# Patient Record
Sex: MALE | Race: WHITE | Employment: UNEMPLOYED | URBAN - METROPOLITAN AREA
[De-identification: names, ages, dates, MRNs, and addresses within clinical notes are randomized per-mention and may not be internally consistent; named-entity substitution may affect disease eponyms.]

---

## 2017-05-08 ENCOUNTER — OFFICE VISIT (OUTPATIENT)
Dept: ORTHOPEDIC SURGERY | Age: 42
End: 2017-05-08

## 2017-05-08 VITALS
BODY MASS INDEX: 35.89 KG/M2 | WEIGHT: 265 LBS | SYSTOLIC BLOOD PRESSURE: 124 MMHG | DIASTOLIC BLOOD PRESSURE: 70 MMHG | HEIGHT: 72 IN

## 2017-05-08 DIAGNOSIS — M25.462 EFFUSION OF LEFT KNEE: Primary | ICD-10-CM

## 2017-05-08 PROCEDURE — 99213 OFFICE O/P EST LOW 20 MIN: CPT | Performed by: ORTHOPAEDIC SURGERY

## 2017-05-08 RX ORDER — ATORVASTATIN CALCIUM 20 MG/1
20 TABLET, FILM COATED ORAL DAILY
COMMUNITY

## 2017-05-08 RX ORDER — LISINOPRIL 10 MG/1
10 TABLET ORAL DAILY
COMMUNITY
End: 2017-07-27 | Stop reason: ALTCHOICE

## 2017-05-17 ENCOUNTER — OFFICE VISIT (OUTPATIENT)
Dept: ORTHOPEDIC SURGERY | Age: 42
End: 2017-05-17

## 2017-05-17 DIAGNOSIS — M25.462 EFFUSION OF LEFT KNEE: Primary | ICD-10-CM

## 2017-05-17 PROCEDURE — 99213 OFFICE O/P EST LOW 20 MIN: CPT | Performed by: ORTHOPAEDIC SURGERY

## 2017-06-16 ENCOUNTER — TELEPHONE (OUTPATIENT)
Dept: ORTHOPEDIC SURGERY | Age: 42
End: 2017-06-16

## 2017-07-27 ENCOUNTER — HOSPITAL ENCOUNTER (OUTPATIENT)
Dept: SURGERY | Age: 42
Discharge: HOME | End: 2017-07-27
Attending: ORTHOPAEDIC SURGERY | Admitting: ORTHOPAEDIC SURGERY

## 2017-07-27 VITALS
WEIGHT: 275.1 LBS | TEMPERATURE: 97 F | RESPIRATION RATE: 16 BRPM | BODY MASS INDEX: 38.51 KG/M2 | HEIGHT: 71 IN | DIASTOLIC BLOOD PRESSURE: 91 MMHG | HEART RATE: 70 BPM | SYSTOLIC BLOOD PRESSURE: 147 MMHG | OXYGEN SATURATION: 97 %

## 2017-07-27 LAB
ANION GAP SERPL CALCULATED.3IONS-SCNC: 11 MMOL/L (ref 3–16)
BUN BLDV-MCNC: 16 MG/DL (ref 7–20)
CALCIUM SERPL-MCNC: 9.5 MG/DL (ref 8.3–10.6)
CHLORIDE BLD-SCNC: 102 MMOL/L (ref 99–110)
CO2: 26 MMOL/L (ref 21–32)
CREAT SERPL-MCNC: 0.8 MG/DL (ref 0.9–1.3)
GFR AFRICAN AMERICAN: >60
GFR NON-AFRICAN AMERICAN: >60
GLUCOSE BLD-MCNC: 122 MG/DL (ref 70–99)
HCT VFR BLD CALC: 41 % (ref 40.5–52.5)
HEMOGLOBIN: 14 G/DL (ref 13.5–17.5)
MCH RBC QN AUTO: 28.3 PG (ref 26–34)
MCHC RBC AUTO-ENTMCNC: 34.1 G/DL (ref 31–36)
MCV RBC AUTO: 82.9 FL (ref 80–100)
PDW BLD-RTO: 14 % (ref 12.4–15.4)
PLATELET # BLD: 173 K/UL (ref 135–450)
PMV BLD AUTO: 7.8 FL (ref 5–10.5)
POTASSIUM SERPL-SCNC: 4.4 MMOL/L (ref 3.5–5.1)
RBC # BLD: 4.94 M/UL (ref 4.2–5.9)
SODIUM BLD-SCNC: 139 MMOL/L (ref 136–145)
WBC # BLD: 5.4 K/UL (ref 4–11)

## 2017-07-27 RX ORDER — LIDOCAINE HYDROCHLORIDE 10 MG/ML
0.5 INJECTION, SOLUTION EPIDURAL; INFILTRATION; INTRACAUDAL; PERINEURAL ONCE
Status: COMPLETED | OUTPATIENT
Start: 2017-07-27 | End: 2017-07-27

## 2017-07-27 RX ORDER — HYDROCODONE BITARTRATE AND ACETAMINOPHEN 5; 325 MG/1; MG/1
1 TABLET ORAL
Status: COMPLETED | OUTPATIENT
Start: 2017-07-27 | End: 2017-07-27

## 2017-07-27 RX ORDER — SODIUM CHLORIDE, SODIUM LACTATE, POTASSIUM CHLORIDE, CALCIUM CHLORIDE 600; 310; 30; 20 MG/100ML; MG/100ML; MG/100ML; MG/100ML
INJECTION, SOLUTION INTRAVENOUS CONTINUOUS
Status: DISCONTINUED | OUTPATIENT
Start: 2017-07-27 | End: 2017-07-28 | Stop reason: HOSPADM

## 2017-07-27 RX ORDER — IBUPROFEN 800 MG/1
800 TABLET ORAL EVERY 6 HOURS PRN
Qty: 120 TABLET | Refills: 3 | Status: SHIPPED | OUTPATIENT
Start: 2017-07-27

## 2017-07-27 RX ORDER — HYDROCODONE BITARTRATE AND ACETAMINOPHEN 5; 325 MG/1; MG/1
1 TABLET ORAL EVERY 6 HOURS PRN
Qty: 30 TABLET | Refills: 0 | Status: SHIPPED | OUTPATIENT
Start: 2017-07-27 | End: 2017-08-03

## 2017-07-27 RX ORDER — HYDROMORPHONE HCL 110MG/55ML
0.5 PATIENT CONTROLLED ANALGESIA SYRINGE INTRAVENOUS EVERY 5 MIN PRN
Status: DISCONTINUED | OUTPATIENT
Start: 2017-07-27 | End: 2017-07-28 | Stop reason: HOSPADM

## 2017-07-27 RX ORDER — HYDROMORPHONE HCL 110MG/55ML
0.25 PATIENT CONTROLLED ANALGESIA SYRINGE INTRAVENOUS EVERY 5 MIN PRN
Status: DISCONTINUED | OUTPATIENT
Start: 2017-07-27 | End: 2017-07-28 | Stop reason: HOSPADM

## 2017-07-27 RX ORDER — FENTANYL CITRATE 50 UG/ML
50 INJECTION, SOLUTION INTRAMUSCULAR; INTRAVENOUS EVERY 5 MIN PRN
Status: DISCONTINUED | OUTPATIENT
Start: 2017-07-27 | End: 2017-07-28 | Stop reason: HOSPADM

## 2017-07-27 RX ORDER — FENTANYL CITRATE 50 UG/ML
25 INJECTION, SOLUTION INTRAMUSCULAR; INTRAVENOUS EVERY 5 MIN PRN
Status: DISCONTINUED | OUTPATIENT
Start: 2017-07-27 | End: 2017-07-28 | Stop reason: HOSPADM

## 2017-07-27 RX ORDER — SODIUM CHLORIDE 9 MG/ML
INJECTION, SOLUTION INTRAVENOUS CONTINUOUS
Status: CANCELLED | OUTPATIENT
Start: 2017-07-27

## 2017-07-27 RX ORDER — SODIUM CHLORIDE 0.9 % (FLUSH) 0.9 %
10 SYRINGE (ML) INJECTION EVERY 12 HOURS SCHEDULED
Status: CANCELLED | OUTPATIENT
Start: 2017-07-27

## 2017-07-27 RX ORDER — LABETALOL HYDROCHLORIDE 5 MG/ML
5 INJECTION, SOLUTION INTRAVENOUS EVERY 10 MIN PRN
Status: DISCONTINUED | OUTPATIENT
Start: 2017-07-27 | End: 2017-07-28 | Stop reason: HOSPADM

## 2017-07-27 RX ORDER — SODIUM CHLORIDE 0.9 % (FLUSH) 0.9 %
10 SYRINGE (ML) INJECTION PRN
Status: CANCELLED | OUTPATIENT
Start: 2017-07-27

## 2017-07-27 RX ORDER — ONDANSETRON 2 MG/ML
4 INJECTION INTRAMUSCULAR; INTRAVENOUS
Status: ACTIVE | OUTPATIENT
Start: 2017-07-27 | End: 2017-07-27

## 2017-07-27 RX ORDER — LOSARTAN POTASSIUM 25 MG/1
25 TABLET ORAL DAILY
COMMUNITY

## 2017-07-27 RX ORDER — SODIUM CHLORIDE, SODIUM LACTATE, POTASSIUM CHLORIDE, CALCIUM CHLORIDE 600; 310; 30; 20 MG/100ML; MG/100ML; MG/100ML; MG/100ML
INJECTION, SOLUTION INTRAVENOUS CONTINUOUS
Status: CANCELLED | OUTPATIENT
Start: 2017-07-27

## 2017-07-27 RX ADMIN — FENTANYL CITRATE 50 MCG: 50 INJECTION, SOLUTION INTRAMUSCULAR; INTRAVENOUS at 08:11

## 2017-07-27 RX ADMIN — SODIUM CHLORIDE, SODIUM LACTATE, POTASSIUM CHLORIDE, CALCIUM CHLORIDE: 600; 310; 30; 20 INJECTION, SOLUTION INTRAVENOUS at 06:32

## 2017-07-27 RX ADMIN — HYDROCODONE BITARTRATE AND ACETAMINOPHEN 1 TABLET: 5; 325 TABLET ORAL at 08:29

## 2017-07-27 RX ADMIN — LIDOCAINE HYDROCHLORIDE 0.5 ML: 10 INJECTION, SOLUTION EPIDURAL; INFILTRATION; INTRACAUDAL; PERINEURAL at 06:28

## 2017-07-27 ASSESSMENT — PAIN SCALES - GENERAL
PAINLEVEL_OUTOF10: 7
PAINLEVEL_OUTOF10: 3
PAINLEVEL_OUTOF10: 2

## 2017-07-27 ASSESSMENT — PAIN - FUNCTIONAL ASSESSMENT: PAIN_FUNCTIONAL_ASSESSMENT: 0-10

## 2017-07-27 ASSESSMENT — PAIN DESCRIPTION - PAIN TYPE
TYPE: SURGICAL PAIN

## 2017-07-27 ASSESSMENT — PAIN DESCRIPTION - DESCRIPTORS: DESCRIPTORS: ACHING

## 2017-07-31 DIAGNOSIS — S83.204A OTHER TEAR OF UNSPECIFIED MENISCUS, CURRENT INJURY, LEFT KNEE, INITIAL ENCOUNTER: Primary | ICD-10-CM

## 2017-07-31 DIAGNOSIS — M67.52 PLICA OF KNEE, LEFT: ICD-10-CM

## 2017-08-02 ENCOUNTER — HOSPITAL ENCOUNTER (OUTPATIENT)
Dept: PHYSICAL THERAPY | Age: 42
Discharge: HOME | End: 2017-08-02
Admitting: ORTHOPAEDIC SURGERY

## 2017-08-02 ENCOUNTER — OFFICE VISIT (OUTPATIENT)
Dept: ORTHOPEDIC SURGERY | Age: 42
End: 2017-08-02

## 2017-08-02 VITALS — BODY MASS INDEX: 38.52 KG/M2 | HEIGHT: 71 IN | WEIGHT: 275.13 LBS

## 2017-08-02 DIAGNOSIS — S83.232D COMPLEX TEAR OF MEDIAL MENISCUS OF LEFT KNEE AS CURRENT INJURY, SUBSEQUENT ENCOUNTER: Primary | ICD-10-CM

## 2017-08-02 PROBLEM — S83.232A COMPLEX TEAR OF MEDIAL MENISCUS OF LEFT KNEE AS CURRENT INJURY: Status: ACTIVE | Noted: 2017-08-02

## 2017-08-02 PROCEDURE — 99024 POSTOP FOLLOW-UP VISIT: CPT | Performed by: ORTHOPAEDIC SURGERY

## 2017-08-08 ENCOUNTER — HOSPITAL ENCOUNTER (OUTPATIENT)
Dept: PHYSICAL THERAPY | Age: 42
Discharge: HOME | End: 2017-08-08
Admitting: ORTHOPAEDIC SURGERY

## 2017-08-11 ENCOUNTER — HOSPITAL ENCOUNTER (OUTPATIENT)
Dept: PHYSICAL THERAPY | Age: 42
Discharge: HOME | End: 2017-08-11
Admitting: ORTHOPAEDIC SURGERY

## 2017-08-14 ENCOUNTER — HOSPITAL ENCOUNTER (OUTPATIENT)
Dept: PHYSICAL THERAPY | Age: 42
Discharge: HOME | End: 2017-08-14
Admitting: ORTHOPAEDIC SURGERY

## 2017-08-18 ENCOUNTER — HOSPITAL ENCOUNTER (OUTPATIENT)
Dept: PHYSICAL THERAPY | Age: 42
Discharge: HOME | End: 2017-08-18
Admitting: ORTHOPAEDIC SURGERY

## 2017-08-21 ENCOUNTER — HOSPITAL ENCOUNTER (OUTPATIENT)
Dept: PHYSICAL THERAPY | Age: 42
Discharge: HOME | End: 2017-08-21
Admitting: ORTHOPAEDIC SURGERY

## 2017-08-24 ENCOUNTER — HOSPITAL ENCOUNTER (OUTPATIENT)
Dept: PHYSICAL THERAPY | Age: 42
Discharge: HOME | End: 2017-08-24
Admitting: ORTHOPAEDIC SURGERY

## 2017-08-30 ENCOUNTER — OFFICE VISIT (OUTPATIENT)
Dept: ORTHOPEDIC SURGERY | Age: 42
End: 2017-08-30

## 2017-08-30 VITALS — HEIGHT: 70 IN | WEIGHT: 275 LBS | BODY MASS INDEX: 39.37 KG/M2

## 2017-08-30 DIAGNOSIS — S83.232D COMPLEX TEAR OF MEDIAL MENISCUS OF LEFT KNEE AS CURRENT INJURY, SUBSEQUENT ENCOUNTER: Primary | ICD-10-CM

## 2017-08-30 PROCEDURE — 99024 POSTOP FOLLOW-UP VISIT: CPT | Performed by: ORTHOPAEDIC SURGERY

## 2017-09-05 ENCOUNTER — HOSPITAL ENCOUNTER (OUTPATIENT)
Dept: PHYSICAL THERAPY | Age: 42
Discharge: HOME | End: 2017-09-05
Admitting: ORTHOPAEDIC SURGERY

## 2017-09-08 ENCOUNTER — HOSPITAL ENCOUNTER (OUTPATIENT)
Dept: PHYSICAL THERAPY | Age: 42
Discharge: HOME | End: 2017-09-08
Admitting: ORTHOPAEDIC SURGERY

## 2017-09-11 ENCOUNTER — HOSPITAL ENCOUNTER (OUTPATIENT)
Dept: PHYSICAL THERAPY | Age: 42
Discharge: HOME | End: 2017-09-11
Admitting: ORTHOPAEDIC SURGERY

## 2017-09-18 ENCOUNTER — HOSPITAL ENCOUNTER (OUTPATIENT)
Dept: PHYSICAL THERAPY | Age: 42
Discharge: HOME | End: 2017-09-18
Admitting: ORTHOPAEDIC SURGERY

## 2017-09-27 ENCOUNTER — HOSPITAL ENCOUNTER (OUTPATIENT)
Dept: PHYSICAL THERAPY | Age: 42
Discharge: HOME | End: 2017-09-27
Admitting: ORTHOPAEDIC SURGERY

## 2017-10-03 ENCOUNTER — HOSPITAL ENCOUNTER (OUTPATIENT)
Dept: PHYSICAL THERAPY | Age: 42
Discharge: HOME | End: 2017-10-03
Admitting: ORTHOPAEDIC SURGERY

## 2017-10-03 NOTE — FLOWSHEET NOTE
stairs     Home Exercise Program:  Discussed gym progressions and resuming platform tennis, gym 3x week,light regime of walk/ jog program. Recommended icing knee and maintaining stretching flexibility program.   [x] (77582) Reviewed/Progressed HEP activities related to strengthening, flexibility, endurance, ROM of core, proximal hip and LE for functional self-care, mobility, lifting and ambulation/stair navigation   [] (43008)Reviewed/Progressed HEP activities related to improving balance, coordination, kinesthetic sense, posture, motor skill, proprioception of core, proximal hip and LE for self care, mobility, lifting, and ambulation/stair navigation      Manual Treatments:  PROM / STM / Oscillations-Mobs:  G-I, II, III, IV (PA's, Inf., Post.)  [x] (45217) Provided manual therapy to mobilize LE, proximal hip and/or LS spine soft tissue/joints for the purpose of modulating pain, promoting relaxation,  increasing ROM, reducing/eliminating soft tissue swelling/inflammation/restriction, improving soft tissue extensibility and allowing for proper ROM for normal function with self care, mobility, lifting and ambulation. Dry needling manual therapy: consisted on the placement of 7 needles in the following muscles:  distal VLAT, rectus femoris mid belly. A 50 mm needle was inserted, piston, rotated, and coned to produce intramuscular mobilization. These techniques were used to restore functional range of motion, reduce muscle spasm and induce healing in the corresponding musculature. (18328)  Clean Technique was utilized today while applying Dry needling treatment. The treatment sites where cleaned with 70% solution of  isopropyl alcohol . The PT washed their hands and utilized treatment gloves along with hand  prior to inserting the needles. All needles where removed and discarded in the appropriate sharps container.        Modalities: heat x10'     Charges:  Timed Code Treatment Minutes: 40 mins   Total Alter current plan (see comments)  [] Plan of care initiated [] Hold pending MD visit [] Discharge     Electronically signed by:  Steve Osborn PT

## 2017-10-05 ENCOUNTER — HOSPITAL ENCOUNTER (OUTPATIENT)
Dept: PHYSICAL THERAPY | Age: 42
Discharge: HOME | End: 2017-10-05
Admitting: ORTHOPAEDIC SURGERY

## 2017-10-05 NOTE — FLOWSHEET NOTE
[address], [facility]    Physical Therapy Daily Treatment Note  Date:  10/5/2017    Patient Name:  Scott Dowell    :  1975  MRN: [id number]  Restrictions/Precautions:    Medical/Treatment Diagnosis Information:  Diagnosis: U85.787J (meniscus tear left knee), R27.92 (plica of left knee)   Treatment Diagnosis: M25.562 (L knee pain)  Insurance/Certification information:  PT Insurance Information:   Physician Information:  Referring Practitioner: Dr. Grant Almeida of care signed (Y/N):     Date of Patient follow up with Physician: Kolton DIAZ     G-Code (if applicable):      Date G-Code Applied:         Progress Note: [x]  Yes  []  No  Next due by: Visit #10       Latex Allergy:  [x]NO      []YES  Preferred Language for Healthcare:   [x]English       []other:    Visit # Insurance Allowable   12  10/5 20     Pain level:  1/10     SUBJECTIVE: 9  Weeks s/p and admits to still having some medial knee pain noticed with walking but not consistently. Had some soreness from DN but not significant like he had expected. Has held himself from [employer] currently in light of having medial knee pain along border patella. OBJECTIVE:   Observation:   Test measurements:      L knee ROM -4 degrees   14 L knee AROM 0-96 degrees,  110 PROM   17  Normal gait entering clinic. Steri strips removed, portals closed. No drainage. ROM 0-115/ 120.  17  Tight extension pre tx. 0 ext with gentle PROM OP.  RT.  ROM 0-132  17 scar tissue, infrapatellar tenderness/ +ITB tightness/ tenderness  17 + mild increased swelling over the fat pad and portals. + tight ITB distal- central 1/3. Medial border of the patella continues with tenderness. 10/5/17 -TTP over the medial knee.  Improved flexibility to ITB, less TTP    RESTRICTIONS/PRECAUTIONS: PMM, excision of medial synovial plica 3/00/82    Exercises/Interventions:     Therapeutic Ex  40' Resistance Sets/sec Reps Notes   Retro Stepper/BIKE   6'    longsit hamstring/ incline board/ pirformis       SLR  Flex/  ABD/ EXT   SLR+  Supine  2# 30\" 3xSAQ wallsits  BS 1min 3x    Clam ABD 2#1 30    Hip Ext /table        bridge            Leg Press Ecc 0-  SL   120#    1 25    Cybex HS curl 35# 1 25    MH TKE  ABD/Flex   75#  3 10    Glute side walks blue 2 10    SL balance   HOLDGastroc/soleus stretch  standing sheetpull  3 30 secs ea    LSD  HOLD   CC TRX squats 2 chair w/ airmat HOLD   Rev slider lunge     Manual Intervention        Knee mobs/PROMTib/Fem Mobs       Patella Mobs  Ankle mobs  STM  DN  NMR re-education        [place]/Biofeedback 10/10       G. Med activaiton/sidelying       G. Max Activation/prone       Hip Ext full ROM G. Activation       Bosu Bal and Prop- G Med       Single leg stance/Balance/Prop Tandem stance  30\" 3x  1/2 foam   Bosu Retro G. Med act       bosu squats   x25 Slight medial knee pain   Load and explode   NPV    Broad jumps DL/ SL      Ladders    x5'  FWD/ SIDE/ REV PLANES   changing directions         Therapeutic Exercise and NMR EXR  [x] (50950) Provided verbal/tactile cueing for activities related to strengthening, flexibility, endurance, ROM for improvements in LE, proximal hip, and core control with self care, mobility, lifting, ambulation.  [] (26884) Provided verbal/tactile cueing for activities related to improving balance, coordination, kinesthetic sense, posture, motor skill, proprioception  to assist with LE, proximal hip, and core control in self care, mobility, lifting, ambulation and eccentric single leg control.      NMR and Therapeutic Activities:    [] (29713 or 08361) Provided verbal/tactile cueing for activities related to improving balance, coordination, kinesthetic sense, posture, motor skill, proprioception and motor activation to allow for proper function of core, proximal hip and LE with self care and ADLs  [] (49257) Gait Re-education- Provided training and instruction to the patient for proper LE, core and proximal hip recruitment and positioning and eccentric body weight control with ambulation re-education including up and down stairs     Home Exercise Program:     [x] (02237) Reviewed/Progressed HEP activities related to strengthening, flexibility, endurance, ROM of core, proximal hip and LE for functional self-care, mobility, lifting and ambulation/stair navigation   [] (41719)Reviewed/Progressed HEP activities related to improving balance, coordination, kinesthetic sense, posture, motor skill, proprioception of core, proximal hip and LE for self care, mobility, lifting, and ambulation/stair navigation      Manual Treatments:  r.       Modalities: '     Charges:  Timed Code Treatment Minutes: 40 mins   Total Treatment Minutes: 40 mins     [] EVAL  [x] DS(35515) x  3   [] IONTO  [] NMR (28859) x      [] VASO  [] Manual (06650) x       [] Other:   [] TA x       [] Mech Traction (03577)  [] ES(attended) (00701)      [] ES (un) (40013):     GOALS:   Patient stated goal: \"full function of left knee and be able to run, golf, and play platform tennis\"    Therapist goals for Patient:   Short Term Goals: To be achieved in: 2 weeks  1. Independent in HEP and progression per patient tolerance, in order to prevent re-injury. 2. Patient will have a decrease in pain to facilitate improvement in movement, function, and ADLs as indicated by Functional Deficits. Long Term Goals: To be achieved in: 8 weeks  1. Disability index score of 20% or less for the LEFS to assist with reaching prior level of function. 2. Patient will demonstrate increased AROM to within functional limites to allow for proper joint functioning as indicated by patients Functional Deficits. 3. Patient will demonstrate an increase in hip/knee strength 5/5 to allow for proper functional mobility as indicated by patients Functional Deficits.    4. Patient will return to all functional/recreational activities without increased symptoms or restriction. 5. Patient will return to pain free platform tennis. Progression Towards Functional goals:  [x] Patient is progressing as expected towards functional goals listed. [] Progression is slowed due to complexities listed. [] Progression has been slowed due to co-morbidities. [] Plan just implemented, too soon to assess goals progression  [] Other:     ASSESSMENT:        Treatment/Activity Tolerance:  [x] Patient tolerated treatment well [x] Patient limited by fatique  [] Patient limited by pain  [] Patient limited by other medical complications  [x] Other: Pt reported having some MJL discomfort with mini squats/ sls and any activity that loaded the medial compartment. Quads fatigue with progressions today but goal was not to aggravate the joint. Prognosis: [x] Good [] Fair  [] Poor    Patient Requires Follow-up: [x] Yes  [] No    PLAN: Progress strength but continue to monitor MJL soreness/ discomfort. Plans to increase flexibility with quad stretches while maintaining ITB flexibility.   [x] Continue per plan of care [] Alter current plan (see comments)  [] Plan of care initiated [] Hold pending MD visit [] Discharge     Electronically signed by: Shanthi Quick , KAYCE, [id number]

## 2017-10-11 ENCOUNTER — HOSPITAL ENCOUNTER (OUTPATIENT)
Dept: PHYSICAL THERAPY | Age: 42
Discharge: HOME | End: 2017-10-11
Admitting: ORTHOPAEDIC SURGERY

## 2017-10-11 NOTE — FLOWSHEET NOTE
the appropriate sharps container. Modalities: heat x10'     Charges:  Timed Code Treatment Minutes: 40 mins   Total Treatment Minutes: 40 mins     [] EVAL  [x] PU(45025) x  1   [] IONTO  [] NMR (90901) x      [] VASO  [x] Manual (17620) x  2    [] Other:   [] TA x       [] Mech Traction (78768)  [x] ES(attended) (33659)      [] ES (un) (52096):     GOALS:   Patient stated goal: \"full function of left knee and be able to run, golf, and play platform tennis\"    Therapist goals for Patient:   Short Term Goals: To be achieved in: 2 weeks  1. Independent in HEP and progression per patient tolerance, in order to prevent re-injury. 2. Patient will have a decrease in pain to facilitate improvement in movement, function, and ADLs as indicated by Functional Deficits. Long Term Goals: To be achieved in: 8 weeks  1. Disability index score of 20% or less for the LEFS to assist with reaching prior level of function. 2. Patient will demonstrate increased AROM to within functional limites to allow for proper joint functioning as indicated by patients Functional Deficits. 3. Patient will demonstrate an increase in hip/knee strength 5/5 to allow for proper functional mobility as indicated by patients Functional Deficits. 4. Patient will return to all functional/recreational activities without increased symptoms or restriction. 5. Patient will return to pain free platform tennis. Progression Towards Functional goals:  [x] Patient is progressing as expected towards functional goals listed. [] Progression is slowed due to complexities listed. [] Progression has been slowed due to co-morbidities.   [] Plan just implemented, too soon to assess goals progression  [] Other:     ASSESSMENT:        Treatment/Activity Tolerance:  [x] Patient tolerated treatment well [x] Patient limited by fatique  [] Patient limited by pain  [] Patient limited by other medical complications  [x] Other: Pt reported having some MJL

## 2017-10-18 ENCOUNTER — HOSPITAL ENCOUNTER (OUTPATIENT)
Dept: PHYSICAL THERAPY | Age: 42
Discharge: HOME | End: 2017-10-18
Admitting: ORTHOPAEDIC SURGERY

## 2017-10-18 NOTE — FLOWSHEET NOTE
[address], [facility]    Physical Therapy Daily Treatment Note  Date:  10/18/2017    Patient Name:  Yusuf Luciano    :  1975  MRN: [id number]  Restrictions/Precautions:    Medical/Treatment Diagnosis Information:  Diagnosis: S50.337B (meniscus tear left knee), U65.69 (plica of left knee)   Treatment Diagnosis: M25.562 (L knee pain)  Insurance/Certification information:  PT Insurance Information:   Physician Information:  Referring Practitioner: Dr. Melida Moore of care signed (Y/N):     Date of Patient follow up with Physician: Ana Lilia Angulo PRN     G-Code (if applicable):      Date G-Code Applied:         Progress Note: [x]  Yes  []  No  Next due by: Visit #10       Latex Allergy:  [x]NO      []YES  Preferred Language for Healthcare:   [x]English       []other:    Visit # Insurance Allowable   14  10/18 20     Pain level:  1/10     SUBJECTIVE: 9  Weeks s/p Pt reports he noticed a big difference following last Dn, pt reports playing platform tennis 2 times this past week, with no issues during just increased tightness following. OBJECTIVE:   Observation:   Test measurements:      L knee ROM -4 degrees    L knee AROM 0-96 degrees,  110 PROM   17  Normal gait entering clinic. Steri strips removed, portals closed. No drainage. ROM 0-115/ 120.  17  Tight extension pre tx. 0 ext with gentle PROM OP.  RT.  ROM 0-132  17 scar tissue, infrapatellar tenderness/ +ITB tightness/ tenderness  17 + mild increased swelling over the fat pad and portals. + tight ITB distal- central 1/3. Medial border of the patella continues with tenderness. 10/5/17 -TTP over the medial knee.  Improved flexibility to ITB, less TTP    RESTRICTIONS/PRECAUTIONS: PMM, excision of medial synovial plica     Exercises/Interventions:     Therapeutic Ex  40' Resistance Sets/sec Reps Notes   Retro Stepper/BIKE   6'    longsit hamstring/ incline board/ pirformis       SLR  Flex/  ABD/ EXT   SLR+  Supine  2# 30\" 3xSAQ wallsits  BS 1min 3x    Clam ABD 2#1 30    Hip Ext /table        bridge            Leg Press Ecc 0-  SL   120#    1 25    Cybex HS curl 35# 1 25    MH TKE  ABD/Flex   75#  3 10    Glute side walks blue 2 10    SL balance   HOLDGastroc/soleus stretch  standing sheetpull  3 30 secs ea    LSD  HOLD   CC TRX squats 2 chair w/ airmat HOLD   Rev slider lunge     Manual Intervention        Knee mobs/PROMTib/Fem Mobs         5All directions Ankle mobs   STM/IASTM  25VMO, vastus lateralis,  quad tendon, medial retinaculum   ITB foam roller 5' DN  5 min  L distal vlat, mid belly rectus femoris    NMR re-education        [place]izen [place]/Biofeedback 10/10       G. Med activaiton/sidelying       G. Max Activation/prone       Hip Ext full ROM G. Activation       Bosu Bal and Prop- G Med       Single leg stance/Balance/Prop Tandem stance  30\" 3x  1/2 foam   Bosu Retro G. Med act       bosu squats   x25 Slight medial knee pain   Load and explode   NPV    Broad jumps DL/ SL      Ladders    x5'  FWD/ SIDE/ REV PLANES   changing directions         Therapeutic Exercise and NMR EXR  [x] (20299) Provided verbal/tactile cueing for activities related to strengthening, flexibility, endurance, ROM for improvements in LE, proximal hip, and core control with self care, mobility, lifting, ambulation.  [] (31214) Provided verbal/tactile cueing for activities related to improving balance, coordination, kinesthetic sense, posture, motor skill, proprioception  to assist with LE, proximal hip, and core control in self care, mobility, lifting, ambulation and eccentric single leg control.      NMR and Therapeutic Activities:    [] (66626 or 25584) Provided verbal/tactile cueing for activities related to improving balance, coordination, kinesthetic sense, posture, motor skill, proprioception and motor activation to allow for proper function of core, proximal hip and LE with self care and ADLs  [] (91820) Gait Re-education- Provided training and instruction to the patient for proper LE, core and proximal hip recruitment and positioning and eccentric body weight control with ambulation re-education including up and down stairs     Home Exercise Program:     [x] (55963) Reviewed/Progressed HEP activities related to strengthening, flexibility, endurance, ROM of core, proximal hip and LE for functional self-care, mobility, lifting and ambulation/stair navigation   [] (21765)Reviewed/Progressed HEP activities related to improving balance, coordination, kinesthetic sense, posture, motor skill, proprioception of core, proximal hip and LE for self care, mobility, lifting, and ambulation/stair navigation      Manual Treatments:  PROM / STM / Oscillations-Mobs:  G-I, II, III, IV (PA's, Inf., Post.)  [x] (67402) Provided manual therapy to mobilize LE, proximal hip and/or LS spine soft tissue/joints for the purpose of modulating pain, promoting relaxation,  increasing ROM, reducing/eliminating soft tissue swelling/inflammation/restriction, improving soft tissue extensibility and allowing for proper ROM for normal function with self care, mobility, lifting and ambulation. Dry needling manual therapy: consisted on the placement of 7 needles in the following muscles:  distal VLAT, rectus femoris mid belly. A 50 mm needle was inserted, piston, rotated, and coned to produce intramuscular mobilization. These techniques were used to restore functional range of motion, reduce muscle spasm and induce healing in the corresponding musculature. (43121)  Clean Technique was utilized today while applying Dry needling treatment. The treatment sites where cleaned with 70% solution of  isopropyl alcohol . The PT washed their hands and utilized treatment gloves along with hand  prior to inserting the needles. All needles where removed and discarded in the appropriate sharps container.        Modalities: heat x10' Charges:  Timed Code Treatment Minutes: 40 mins   Total Treatment Minutes: 40 mins     [] EVAL  [x] XP(69758) x  1   [] IONTO  [] NMR (62973) x      [] VASO  [x] Manual (57103) x  2    [] Other:   [] TA x       [] Mech Traction (97696)  [x] ES(attended) (32835)      [] ES (un) (72939):     GOALS:   Patient stated goal: \"full function of left knee and be able to run, golf, and play platform tennis\"    Therapist goals for Patient:   Short Term Goals: To be achieved in: 2 weeks  1. Independent in HEP and progression per patient tolerance, in order to prevent re-injury. 2. Patient will have a decrease in pain to facilitate improvement in movement, function, and ADLs as indicated by Functional Deficits. Long Term Goals: To be achieved in: 8 weeks  1. Disability index score of 20% or less for the LEFS to assist with reaching prior level of function. 2. Patient will demonstrate increased AROM to within functional limites to allow for proper joint functioning as indicated by patients Functional Deficits. 3. Patient will demonstrate an increase in hip/knee strength 5/5 to allow for proper functional mobility as indicated by patients Functional Deficits. 4. Patient will return to all functional/recreational activities without increased symptoms or restriction. 5. Patient will return to pain free platform tennis. Progression Towards Functional goals:  [x] Patient is progressing as expected towards functional goals listed. [] Progression is slowed due to complexities listed. [] Progression has been slowed due to co-morbidities.   [] Plan just implemented, too soon to assess goals progression  [] Other:     ASSESSMENT:        Treatment/Activity Tolerance:  [x] Patient tolerated treatment well [x] Patient limited by fatique  [] Patient limited by pain  [] Patient limited by other medical complications  [x] Other: Pt reported having some MJL discomfort with mini squats/ sls and any activity that loaded the

## 2017-10-20 ENCOUNTER — HOSPITAL ENCOUNTER (OUTPATIENT)
Dept: PHYSICAL THERAPY | Age: 42
Discharge: HOME | End: 2017-10-20
Admitting: ORTHOPAEDIC SURGERY

## 2017-10-20 NOTE — FLOWSHEET NOTE
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 Vasquez Honeycutt of care signed (Y/N):     Date of Patient follow up with Physician: Armaan DIAZ     G-Code (if applicable):      Date G-Code Applied:         Progress Note: [x]  Yes  []  No  Next due by: Visit #10       Latex Allergy:  [x]NO      []YES  Preferred Language for Healthcare:   [x]English       []other:    Visit # Insurance Allowable   15  10/20 20     Pain level:  1/10     SUBJECTIVE: 9  Weeks s/p Pt reports increased soreness this date, with a decrease in activity. Pt continues to reports \"soreness\" in medial knee. OBJECTIVE:   Observation:   Test measurements:      L knee ROM -4 degrees    L knee AROM 0-96 degrees,  110 PROM   17  Normal gait entering clinic. Steri strips removed, portals closed. No drainage. ROM 0-115/ 120.  17  Tight extension pre tx. 0 ext with gentle PROM OP.  RT.  ROM 0-132  17 scar tissue, infrapatellar tenderness/ +ITB tightness/ tenderness  17 + mild increased swelling over the fat pad and portals. + tight ITB distal- central 1/3. Medial border of the patella continues with tenderness. 10/5/17 -TTP over the medial knee.  Improved flexibility to ITB, less TTP    RESTRICTIONS/PRECAUTIONS: PMM, excision of medial synovial plica     Exercises/Interventions:     Therapeutic Ex  40' Resistance Sets/sec Reps Notes   Retro Stepper/BIKE   6'    longsit hamstring/ incline board/ pirformis       SLR  Flex/  ABD/ EXT   SLR+ and instruction to the patient for proper LE, core and proximal hip recruitment and positioning and eccentric body weight control with ambulation re-education including up and down stairs     Home Exercise Program:     [x] (97680) Reviewed/Progressed HEP activities related to strengthening, flexibility, endurance, ROM of core, proximal hip and LE for functional self-care, mobility, lifting and ambulation/stair navigation   [] (69370)Reviewed/Progressed HEP activities related to improving balance, coordination, kinesthetic sense, posture, motor skill, proprioception of core, proximal hip and LE for self care, mobility, lifting, and ambulation/stair navigation      Manual Treatments:  PROM / STM / Oscillations-Mobs:  G-I, II, III, IV (PA's, Inf., Post.)  [x] (15118) Provided manual therapy to mobilize LE, proximal hip and/or LS spine soft tissue/joints for the purpose of modulating pain, promoting relaxation,  increasing ROM, reducing/eliminating soft tissue swelling/inflammation/restriction, improving soft tissue extensibility and allowing for proper ROM for normal function with self care, mobility, lifting and ambulation. Dry needling manual therapy: consisted on the placement of 7 needles in the following muscles:  distal VLAT, rectus femoris mid belly. A 50 mm needle was inserted, piston, rotated, and coned to produce intramuscular mobilization. These techniques were used to restore functional range of motion, reduce muscle spasm and induce healing in the corresponding musculature. (62524)  Clean Technique was utilized today while applying Dry needling treatment. The treatment sites where cleaned with 70% solution of  isopropyl alcohol . The PT washed their hands and utilized treatment gloves along with hand  prior to inserting the needles. All needles where removed and discarded in the appropriate sharps container.        Modalities: heat x10'     Charges:  Timed Code Treatment Minutes: 45 mins Total Treatment Minutes: 45 mins     [] EVAL  [x] AQ(83211) x  1   [] IONTO  [] NMR (06045) x      [] VASO  [x] Manual (66839) x  2    [] Other:   [] TA x       [] Mech Traction (37771)  [x] ES(attended) (03708)      [] ES (un) (36533):     GOALS:   Patient stated goal: \"full function of left knee and be able to run, golf, and play platform tennis\"    Therapist goals for Patient:   Short Term Goals: To be achieved in: 2 weeks  1. Independent in HEP and progression per patient tolerance, in order to prevent re-injury. 2. Patient will have a decrease in pain to facilitate improvement in movement, function, and ADLs as indicated by Functional Deficits. Long Term Goals: To be achieved in: 8 weeks  1. Disability index score of 20% or less for the LEFS to assist with reaching prior level of function. 2. Patient will demonstrate increased AROM to within functional limites to allow for proper joint functioning as indicated by patients Functional Deficits. 3. Patient will demonstrate an increase in hip/knee strength 5/5 to allow for proper functional mobility as indicated by patients Functional Deficits. 4. Patient will return to all functional/recreational activities without increased symptoms or restriction. 5. Patient will return to pain free platform tennis. Progression Towards Functional goals:  [x] Patient is progressing as expected towards functional goals listed. [] Progression is slowed due to complexities listed. [] Progression has been slowed due to co-morbidities. [] Plan just implemented, too soon to assess goals progression  [] Other:     ASSESSMENT:        Treatment/Activity Tolerance:  [x] Patient tolerated treatment well [x] Patient limited by fatique  [] Patient limited by pain  [] Patient limited by other medical complications  [x] Other: Pt reported having some MJL discomfort with mini squats/ sls and any activity that loaded the medial compartment.  Quads fatigue with progressions today but goal was not to aggravate the joint. Prognosis: [x] Good [] Fair  [] Poor    Patient Requires Follow-up: [x] Yes  [] No    PLAN: Progress strength but continue to monitor MJL soreness/ discomfort. Plans to increase flexibility with quad stretches while maintaining ITB flexibility. [x] Continue per plan of care [] Alter current plan (see comments)  [] Plan of care initiated [] Hold pending MD visit [] Discharge     Electronically signed by:  Jaqueline Lan , PT, DPT

## 2017-10-24 ENCOUNTER — HOSPITAL ENCOUNTER (OUTPATIENT)
Dept: PHYSICAL THERAPY | Age: 42
Discharge: HOME | End: 2017-10-24
Admitting: ORTHOPAEDIC SURGERY

## 2017-10-24 NOTE — FLOWSHEET NOTE
[address], [facility]    Physical Therapy Daily Treatment Note  Date:  10/24/2017    Patient Name:  Beatriz Carson    :  1975  MRN: [id number]  Restrictions/Precautions:    Medical/Treatment Diagnosis Information:  Diagnosis: P75.491G (meniscus tear left knee), L14.80 (plica of left knee)   Treatment Diagnosis: M25.562 (L knee pain)  Insurance/Certification information:  PT Insurance Information:   Physician Information:  Referring Practitioner: Dr. Juan Aguilera of care signed (Y/N):     Date of Patient follow up with Physician: Roger Simpson PRN     G-Code (if applicable):      Date G-Code Applied:         Progress Note: [x]  Yes  []  No  Next due by: Visit #10       Latex Allergy:  [x]NO      []YES  Preferred Language for Healthcare:   [x]English       []other:    Visit # Insurance Allowable   16 10/24 20     Pain level:  1/10     SUBJECTIVE: 10 Weeks s/p Pt continues to report not much change in tightness in upper LE following activity. Pt reports soreness in medial knee with pivot motions. OBJECTIVE:   Observation:   Test measurements:      L knee ROM -4 degrees    L knee AROM 0-96 degrees,  110 PROM   17  Normal gait entering clinic. Steri strips removed, portals closed. No drainage. ROM 0-115/ 120.  17  Tight extension pre tx. 0 ext with gentle PROM OP.  RT.  ROM 0-132  17 scar tissue, infrapatellar tenderness/ +ITB tightness/ tenderness  17 + mild increased swelling over the fat pad and portals. + tight ITB distal- central 1/3. Medial border of the patella continues with tenderness. 10/5/17 -TTP over the medial knee.  Improved flexibility to ITB, less TTP    RESTRICTIONS/PRECAUTIONS: PMM, excision of medial synovial plica 3/90/02    Exercises/Interventions:     Therapeutic Ex  Resistance Sets/sec Reps Notes   Retro Stepper/BIKE   6'    Elliptical    3 min    longsit hamstring/ incline board/ pirformis SLR  Flex/  ABD/ EXT   SLR+  Supine  2# 30\" 3xSAQ wallsits  BS 1min 3x    Clam ABD 2#1 30    Hip Ext /table        bridge            Leg Press Ecc 0-  SL   120#    1 25    Cybex HS curl 35# 1 25    MH TKE  ABD/Flex   75#  3 10    Glute side walks blue 2 10    SL balance   HOLDGastroc/soleus stretch  standing sheetpull  3 30 secs ea    LSD  HOLD   CC TRX squats 2 chair w/ airmat HOLD   Rev slider lunge     Manual Intervention 35       Knee mobs/PROMTib/Fem Mobs         5All directions Ankle mobs   STM/IASTM  25VMO, vastus lateralis,  quad tendon, medial retinaculum   ITB foam roller 5' DN  5 min  L distal vlat, mid belly rectus femoris    NMR re-education        [place]an/Biofeedback 10/10       G. Med activaiton/sidelying       G. Max Activation/prone       Hip Ext full ROM G. Activation       Bosu Bal and Prop- G Med       Single leg stance/Balance/Prop Tandem stance  30\" 3x  1/2 foam   Bosu Retro G. Med act       bosu squats   x25 Slight medial knee pain   Load and explode   NPV    Broad jumps DL/ SL      Ladders    x5'  FWD/ SIDE/ REV PLANES   changing directions         Therapeutic Exercise and NMR EXR  [x] (74214) Provided verbal/tactile cueing for activities related to strengthening, flexibility, endurance, ROM for improvements in LE, proximal hip, and core control with self care, mobility, lifting, ambulation.  [] (37029) Provided verbal/tactile cueing for activities related to improving balance, coordination, kinesthetic sense, posture, motor skill, proprioception  to assist with LE, proximal hip, and core control in self care, mobility, lifting, ambulation and eccentric single leg control.      NMR and Therapeutic Activities:    [] (31116 or 42352) Provided verbal/tactile cueing for activities related to improving balance, coordination, kinesthetic sense, posture, motor skill, proprioception and motor activation to allow for proper function of core, proximal hip and LE with self care and ADLs  [] (65110) Gait Re-education- Provided training and instruction to the patient for proper LE, core and proximal hip recruitment and positioning and eccentric body weight control with ambulation re-education including up and down stairs     Home Exercise Program:     [x] (14008) Reviewed/Progressed HEP activities related to strengthening, flexibility, endurance, ROM of core, proximal hip and LE for functional self-care, mobility, lifting and ambulation/stair navigation   [] (84431)Reviewed/Progressed HEP activities related to improving balance, coordination, kinesthetic sense, posture, motor skill, proprioception of core, proximal hip and LE for self care, mobility, lifting, and ambulation/stair navigation      Manual Treatments:  PROM / STM / Oscillations-Mobs:  G-I, II, III, IV (PA's, Inf., Post.)  [x] (63909) Provided manual therapy to mobilize LE, proximal hip and/or LS spine soft tissue/joints for the purpose of modulating pain, promoting relaxation,  increasing ROM, reducing/eliminating soft tissue swelling/inflammation/restriction, improving soft tissue extensibility and allowing for proper ROM for normal function with self care, mobility, lifting and ambulation. Dry needling manual therapy: consisted on the placement of 7 needles in the following muscles:  distal VLAT, rectus femoris mid belly. A 50 mm needle was inserted, piston, rotated, and coned to produce intramuscular mobilization. These techniques were used to restore functional range of motion, reduce muscle spasm and induce healing in the corresponding musculature. (57918)  Clean Technique was utilized today while applying Dry needling treatment. The treatment sites where cleaned with 70% solution of  isopropyl alcohol . The PT washed their hands and utilized treatment gloves along with hand  prior to inserting the needles. All needles where removed and discarded in the appropriate sharps container.        Modalities: heat x10' and any activity that loaded the medial compartment. Quads fatigue with progressions today but goal was not to aggravate the joint. Prognosis: [x] Good [] Fair  [] Poor    Patient Requires Follow-up: [x] Yes  [] No    PLAN: Assess response to Dn, consider referral back to Dr. Alon Henley if no change. [x] Continue per plan of care [] Alter current plan (see comments)  [] Plan of care initiated [] Hold pending MD visit [] Discharge     Electronically signed by:  Rosibel Aguilar , PT, DPT

## 2017-10-31 ENCOUNTER — HOSPITAL ENCOUNTER (OUTPATIENT)
Dept: PHYSICAL THERAPY | Age: 42
Discharge: HOME | End: 2017-10-31
Admitting: ORTHOPAEDIC SURGERY

## 2017-10-31 NOTE — FLOWSHEET NOTE
[address], [facility]    Physical Therapy Daily Treatment Note  Date:  10/31/2017    Patient Name:  Emperatriz Juarez    :  1975  MRN: [id number]  Restrictions/Precautions:    Medical/Treatment Diagnosis Information:  Diagnosis: P00.731V (meniscus tear left knee), L80.81 (plica of left knee)   Treatment Diagnosis: M25.562 (L knee pain)  Insurance/Certification information:  PT Insurance Information:   Physician Information:  Referring Practitioner: Dr. Arnie Moritz of care signed (Y/N):     Date of Patient follow up with Physician: Barbi DIAZ     G-Code (if applicable):      Date G-Code Applied:         Progress Note: [x]  Yes  []  No  Next due by: Visit #10       Latex Allergy:  [x]NO      []YES  Preferred Language for Healthcare:   [x]English       []other:    Visit # Insurance Allowable   17 10/31 20     Pain level:  1/10     SUBJECTIVE: 10 Weeks s/p Pt reports no tightness/pain in knee until Sat night following 1st tennis match since last visit. Following activity pt reports returning to [facility]. OBJECTIVE:   Observation:   Test measurements:      L knee ROM -4 degrees    L knee AROM 0-96 degrees,  110 PROM   17  Normal gait entering clinic. Steri strips removed, portals closed. No drainage. ROM 0-115/ 120.  17  Tight extension pre tx. 0 ext with gentle PROM OP.  RT.  ROM 0-132  17 scar tissue, infrapatellar tenderness/ +ITB tightness/ tenderness  17 + mild increased swelling over the fat pad and portals. + tight ITB distal- central 1/3. Medial border of the patella continues with tenderness. 10/5/17 -TTP over the medial knee.  Improved flexibility to ITB, less TTP    RESTRICTIONS/PRECAUTIONS: PMM, excision of medial synovial plica     Exercises/Interventions:     Therapeutic Ex  Resistance Sets/sec Reps Notes   Retro Stepper/BIKE   6'    Elliptical    3 min    longsit hamstring/ incline squats/ sls and any activity that loaded the medial compartment. Quads fatigue with progressions today but goal was not to aggravate the joint. Prognosis: [x] Good [] Fair  [] Poor    Patient Requires Follow-up: [x] Yes  [] No    PLAN: Assess response to Dn, consider referral back to Dr. Denice Aldridge if no change NPV  [x] Continue per plan of care [] Alter current plan (see comments)  [] Plan of care initiated [] Hold pending MD visit [] Discharge     Electronically signed by:  Oli Acevedo , PT, DPT

## 2017-11-01 ENCOUNTER — HOSPITAL ENCOUNTER (OUTPATIENT)
Dept: PHYSICAL THERAPY | Age: 42
Discharge: STILL ADMITTED OR ADMINISTRATIVE | End: 2017-11-24
Attending: ORTHOPAEDIC SURGERY | Admitting: ORTHOPAEDIC SURGERY

## 2017-11-08 ENCOUNTER — HOSPITAL ENCOUNTER (OUTPATIENT)
Dept: PHYSICAL THERAPY | Age: 42
Discharge: HOME | End: 2017-11-08
Admitting: ORTHOPAEDIC SURGERY

## 2017-11-08 NOTE — FLOWSHEET NOTE
[address], [facility]    Physical Therapy Daily Treatment Note  Date:  2017    Patient Name:  Cholo Siu    :  1975  MRN: [id number]  Restrictions/Precautions:    Medical/Treatment Diagnosis Information:  Diagnosis: X80.242F (meniscus tear left knee), I86.79 (plica of left knee)   Treatment Diagnosis: M25.562 (L knee pain)  Insurance/Certification information:  PT Insurance Information:   Physician Information:  Referring Practitioner: Dr. Chalino Priest of care signed (Y/N):     Date of Patient follow up with Physician: Jose DIAZ     G-Code (if applicable):      Date G-Code Applied:         Progress Note: [x]  Yes  []  No  Next due by: Visit #10       Latex Allergy:  [x]NO      []YES  Preferred Language for Healthcare:   [x]English       []other:    Visit # Insurance Allowable        Pain level:  1/10     SUBJECTIVE: 11 Weeks s/p Pt reports he has played tennis 4x in the past 5 days with no pain/tightness in the knee. Pt does report \"something\" medially in the knee when standing from a chair  OBJECTIVE:   Observation:   Test measurements:      L knee ROM -4 degrees    L knee AROM 0-96 degrees,  110 PROM   17  Normal gait entering clinic. Steri strips removed, portals closed. No drainage. ROM 0-115/ 120.  17  Tight extension pre tx. 0 ext with gentle PROM OP.  RT.  ROM 0-132  17 scar tissue, infrapatellar tenderness/ +ITB tightness/ tenderness  17 + mild increased swelling over the fat pad and portals. + tight ITB distal- central 1/3. Medial border of the patella continues with tenderness. 10/5/17 -TTP over the medial knee.  Improved flexibility to ITB, less TTP    RESTRICTIONS/PRECAUTIONS: PMM, excision of medial synovial plica     Exercises/Interventions:     Therapeutic Ex  Resistance Sets/sec Reps Notes   Retro Stepper/BIKE   6'    Elliptical    3 min    longsit hamstring/ incline board/ pirformis       SLR  Flex/  ABD/ EXT   SLR+  Supine  2# 30\" 3xSAQ wallsits  BS 1min 3x    Clam ABD 2#1 30    Hip Ext /table        bridge            Leg Press Ecc 0-  SL   125#    1 25    Cybex HS curl 35# 1 25    MH TKE  ABD/Flex   75#  3 10    TRX squats2 10 Glute side walks blue 2 10    SL balance   HOLDGastroc/soleus stretch  standing sheetpull  3 30 secs ea    LSD  HOLD   CC TRX squats 2 chair w/ airmat HOLD   Rev slider lunge     Manual Intervention 35       Knee mobs/PROMTib/Fem Mobs         5All directions Ankle mobs   STM/IASTM  25VMO, vastus lateralis,  quad tendon, medial retinaculum   ITB foam roller 5' DN  5 min  L distal vlat, mid belly rectus femoris    NMR re-education        Bhutanese/Biofeedback 10/10       G. Med activaiton/sidelying       G. Max Activation/prone       Hip Ext full ROM G. Activation       Bosu Bal and Prop- G Med       Single leg stance/Balance/Prop Tandem stance  30\" 3x  1/2 foam   Bosu Retro G. Med act       bosu squats   x25 Slight medial knee pain   Load and explode   NPV    Broad jumps DL/ SL      Ladders    x5'  FWD/ SIDE/ REV PLANES   changing directions         Therapeutic Exercise and NMR EXR  [x] (26978) Provided verbal/tactile cueing for activities related to strengthening, flexibility, endurance, ROM for improvements in LE, proximal hip, and core control with self care, mobility, lifting, ambulation.  [] (75261) Provided verbal/tactile cueing for activities related to improving balance, coordination, kinesthetic sense, posture, motor skill, proprioception  to assist with LE, proximal hip, and core control in self care, mobility, lifting, ambulation and eccentric single leg control.      NMR and Therapeutic Activities:    [] (01855 or 71678) Provided verbal/tactile cueing for activities related to improving balance, coordination, kinesthetic sense, posture, motor skill, proprioception and motor activation to allow for proper function of core, proximal hip

## 2017-11-17 ENCOUNTER — HOSPITAL ENCOUNTER (OUTPATIENT)
Dept: PHYSICAL THERAPY | Age: 42
Discharge: HOME | End: 2017-11-17
Admitting: ORTHOPAEDIC SURGERY

## 2017-11-17 NOTE — FLOWSHEET NOTE
wallsits  BS 1min 3x    Clam ABD 2#1 30    Hip Ext /table        bridge            Leg Press Ecc 0-  SL   125#    1 25    Cybex HS curl 35# 1 25    MH TKE  ABD/Flex   75#  3 10    TRX squats2 10 Glute side walks blue 2 10    SL balance   HOLDGastroc/soleus stretch  standing sheetpull  3 30 secs ea    LSD  HOLD   CC TRX squats 2 chair w/ airmat HOLD   Rev slider lunge     Manual Intervention 35       Knee mobs/PROMTib/Fem Mobs         5All directions Ankle mobs   STM/IASTM  25VMO, vastus lateralis,  quad tendon, medial retinaculum   ITB foam roller 5' DN  5 min  L distal vlat, mid belly rectus femoris    NMR re-education        Kosovan/Biofeedback 10/10       G. Med activaiton/sidelying       G. Max Activation/prone       Hip Ext full ROM G. Activation       Bosu Bal and Prop- G Med       Single leg stance/Balance/Prop Tandem stance  30\" 3x  1/2 foam   Bosu Retro G. Med act       bosu squats   x25 Slight medial knee pain   Load and explode   NPV    Broad jumps DL/ SL      Ladders    x5'  FWD/ SIDE/ REV PLANES   changing directions         Therapeutic Exercise and NMR EXR  [x] (76028) Provided verbal/tactile cueing for activities related to strengthening, flexibility, endurance, ROM for improvements in LE, proximal hip, and core control with self care, mobility, lifting, ambulation.  [] (09344) Provided verbal/tactile cueing for activities related to improving balance, coordination, kinesthetic sense, posture, motor skill, proprioception  to assist with LE, proximal hip, and core control in self care, mobility, lifting, ambulation and eccentric single leg control.      NMR and Therapeutic Activities:    [] (42830 or 96855) Provided verbal/tactile cueing for activities related to improving balance, coordination, kinesthetic sense, posture, motor skill, proprioception and motor activation to allow for proper function of core, proximal hip and LE with self care and ADLs  [] (57560) Gait Re-education- Provided training and instruction to the patient for proper LE, core and proximal hip recruitment and positioning and eccentric body weight control with ambulation re-education including up and down stairs     Home Exercise Program:     [x] (63210) Reviewed/Progressed HEP activities related to strengthening, flexibility, endurance, ROM of core, proximal hip and LE for functional self-care, mobility, lifting and ambulation/stair navigation   [] (07498)Reviewed/Progressed HEP activities related to improving balance, coordination, kinesthetic sense, posture, motor skill, proprioception of core, proximal hip and LE for self care, mobility, lifting, and ambulation/stair navigation      Manual Treatments:  PROM / STM / Oscillations-Mobs:  G-I, II, III, IV (PA's, Inf., Post.)  [x] (31507) Provided manual therapy to mobilize LE, proximal hip and/or LS spine soft tissue/joints for the purpose of modulating pain, promoting relaxation,  increasing ROM, reducing/eliminating soft tissue swelling/inflammation/restriction, improving soft tissue extensibility and allowing for proper ROM for normal function with self care, mobility, lifting and ambulation. Dry needling manual therapy: consisted on the placement of 7 needles in the following muscles:  distal VLAT, rectus femoris mid belly. A 50 mm needle was inserted, piston, rotated, and coned to produce intramuscular mobilization. These techniques were used to restore functional range of motion, reduce muscle spasm and induce healing in the corresponding musculature. (25286)  Clean Technique was utilized today while applying Dry needling treatment. The treatment sites where cleaned with 70% solution of  isopropyl alcohol . The PT washed their hands and utilized treatment gloves along with hand  prior to inserting the needles. All needles where removed and discarded in the appropriate sharps container.        Modalities: heat x10'     Charges:  Timed Code Treatment Minutes: 40 mins   Total Treatment Minutes: 40 mins     [] EVAL  [x] US(40995) x  1   [] IONTO  [] NMR (43011) x      [] VASO  [x] Manual (71037) x  2    [] Other:   [] TA x       [] Mech Traction (68677)  [] ES(attended) (19923)      [] ES (un) (12810):     GOALS:   Patient stated goal: \"full function of left knee and be able to run, golf, and play platform tennis\"    Therapist goals for Patient:   Short Term Goals: To be achieved in: 2 weeks  1. Independent in HEP and progression per patient tolerance, in order to prevent re-injury. 2. Patient will have a decrease in pain to facilitate improvement in movement, function, and ADLs as indicated by Functional Deficits. Long Term Goals: To be achieved in: 8 weeks  1. Disability index score of 20% or less for the LEFS to assist with reaching prior level of function. 2. Patient will demonstrate increased AROM to within functional limites to allow for proper joint functioning as indicated by patients Functional Deficits. 3. Patient will demonstrate an increase in hip/knee strength 5/5 to allow for proper functional mobility as indicated by patients Functional Deficits. 4. Patient will return to all functional/recreational activities without increased symptoms or restriction. 5. Patient will return to pain free platform tennis. Progression Towards Functional goals:  [x] Patient is progressing as expected towards functional goals listed. [] Progression is slowed due to complexities listed. [] Progression has been slowed due to co-morbidities. [] Plan just implemented, too soon to assess goals progression  [] Other:     ASSESSMENT:        Treatment/Activity Tolerance:  [x] Patient tolerated treatment well [x] Patient limited by fatique  [] Patient limited by pain  [] Patient limited by other medical complications  [x] Other: Pt reported having some MJL discomfort with mini squats/ sls and any activity that loaded the medial compartment.  Quads fatigue with progressions today but goal was not to aggravate the joint. Prognosis: [x] Good [] Fair  [] Poor    Patient Requires Follow-up: [x] Yes  [] No    PLAN: Pt nearing end of visits, address knee pain/stiffness to return pt to functional status  [x] Continue per plan of care [] Alter current plan (see comments)  [] Plan of care initiated [] Hold pending MD visit [] Discharge     Electronically signed by:  Jaqueline Lan , PT, DPT

## 2017-11-24 ENCOUNTER — HOSPITAL ENCOUNTER (OUTPATIENT)
Dept: PHYSICAL THERAPY | Age: 42
Discharge: HOME | End: 2017-11-24
Admitting: ORTHOPAEDIC SURGERY

## 2017-11-24 ENCOUNTER — HOSPITAL ENCOUNTER (OUTPATIENT)
Dept: PHYSICAL THERAPY | Age: 42
Discharge: HOME | End: 2017-11-30
Attending: ORTHOPAEDIC SURGERY | Admitting: ORTHOPAEDIC SURGERY

## 2017-11-24 NOTE — FLOWSHEET NOTE
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 Lori Gamble of care signed (Y/N):     Date of Patient follow up with Physician: Marcellus Cheadle PRN     G-Code (if applicable):      Date G-Code Applied:         Progress Note: [x]  Yes  []  No  Next due by: Visit #10       Latex Allergy:  [x]NO      []YES  Preferred Language for Healthcare:   [x]English       []other:    Visit # Insurance Allowable    20  Self pay     Pain level:  1/10     SUBJECTIVE: 12 Weeks s/p Pt reports leg felt great for 24 hours following last visit but returned to about status quo following his tennis game the next day. OBJECTIVE:   Observation:   Test measurements:      L knee ROM -4 degrees   14 L knee AROM 0-96 degrees,  110 PROM   17  Normal gait entering clinic. Steri strips removed, portals closed. No drainage. ROM 0-115/ 120.  17  Tight extension pre tx. 0 ext with gentle PROM OP.  RT.  ROM 0-132  17 scar tissue, infrapatellar tenderness/ +ITB tightness/ tenderness  17 + mild increased swelling over the fat pad and portals. + tight ITB distal- central 1/3. Medial border of the patella continues with tenderness. 10/5/17 -TTP over the medial knee.  Improved flexibility to ITB, less TTP    RESTRICTIONS/PRECAUTIONS: PMM, excision of medial synovial plica     Exercises/Interventions:     Therapeutic Ex  Resistance Sets/sec Reps Notes   Retro Stepper/BIKE   6'    Elliptical    3 min    longsit hamstring/ incline board/ care and ADLs  [] (92501) Gait Re-education- Provided training and instruction to the patient for proper LE, core and proximal hip recruitment and positioning and eccentric body weight control with ambulation re-education including up and down stairs     Home Exercise Program:     [x] (64699) Reviewed/Progressed HEP activities related to strengthening, flexibility, endurance, ROM of core, proximal hip and LE for functional self-care, mobility, lifting and ambulation/stair navigation   [] (36199)Reviewed/Progressed HEP activities related to improving balance, coordination, kinesthetic sense, posture, motor skill, proprioception of core, proximal hip and LE for self care, mobility, lifting, and ambulation/stair navigation      Manual Treatments:  PROM / STM / Oscillations-Mobs:  G-I, II, III, IV (PA's, Inf., Post.)  [x] (86414) Provided manual therapy to mobilize LE, proximal hip and/or LS spine soft tissue/joints for the purpose of modulating pain, promoting relaxation,  increasing ROM, reducing/eliminating soft tissue swelling/inflammation/restriction, improving soft tissue extensibility and allowing for proper ROM for normal function with self care, mobility, lifting and ambulation. Dry needling manual therapy: consisted on the placement of 7 needles in the following muscles:  distal VLAT, rectus femoris mid belly. A 50 mm needle was inserted, piston, rotated, and coned to produce intramuscular mobilization. These techniques were used to restore functional range of motion, reduce muscle spasm and induce healing in the corresponding musculature. (42506)  Clean Technique was utilized today while applying Dry needling treatment. The treatment sites where cleaned with 70% solution of  isopropyl alcohol . The PT washed their hands and utilized treatment gloves along with hand  prior to inserting the needles. All needles where removed and discarded in the appropriate sharps container.        Modalities: heat x10'     Charges:  Timed Code Treatment Minutes: 20 mins   Total Treatment Minutes: 30 mins     [] EVAL  [] OU(63068) x      [] IONTO  [] NMR (58027) x      [] VASO  [x] Manual (69036) x  1    [] Other:   [] TA x       [] Mech Traction (73416)  [] ES(attended) (17868)      [] ES (un) (15443):     GOALS:   Patient stated goal: \"full function of left knee and be able to run, golf, and play platform tennis\"    Therapist goals for Patient:   Short Term Goals: To be achieved in: 2 weeks  1. Independent in HEP and progression per patient tolerance, in order to prevent re-injury. 2. Patient will have a decrease in pain to facilitate improvement in movement, function, and ADLs as indicated by Functional Deficits. Long Term Goals: To be achieved in: 8 weeks  1. Disability index score of 20% or less for the LEFS to assist with reaching prior level of function. 2. Patient will demonstrate increased AROM to within functional limites to allow for proper joint functioning as indicated by patients Functional Deficits. 3. Patient will demonstrate an increase in hip/knee strength 5/5 to allow for proper functional mobility as indicated by patients Functional Deficits. 4. Patient will return to all functional/recreational activities without increased symptoms or restriction. 5. Patient will return to pain free platform tennis. Progression Towards Functional goals:  [x] Patient is progressing as expected towards functional goals listed. [] Progression is slowed due to complexities listed. [] Progression has been slowed due to co-morbidities.   [] Plan just implemented, too soon to assess goals progression  [] Other:     ASSESSMENT:        Treatment/Activity Tolerance:  [x] Patient tolerated treatment well [x] Patient limited by fatique  [] Patient limited by pain  [] Patient limited by other medical complications  [x] Other: Pt reported having some MJL discomfort with mini squats/ sls and any activity that

## 2017-12-01 ENCOUNTER — HOSPITAL ENCOUNTER (OUTPATIENT)
Dept: PHYSICAL THERAPY | Age: 42
Discharge: HOME | End: 2017-12-31
Attending: ORTHOPAEDIC SURGERY | Admitting: ORTHOPAEDIC SURGERY

## 2017-12-12 ENCOUNTER — HOSPITAL ENCOUNTER (OUTPATIENT)
Dept: PHYSICAL THERAPY | Age: 42
Discharge: HOME | End: 2017-12-12
Admitting: ORTHOPAEDIC SURGERY

## 2017-12-12 NOTE — FLOWSHEET NOTE
the appropriate sharps container. Modalities: heat x10'     Charges:  Timed Code Treatment Minutes: 20 mins   Total Treatment Minutes: 30 mins     [] EVAL  [] GP(21275) x      [] IONTO  [] NMR (75270) x      [] VASO  [x] Manual (18290) x  1    [] Other:   [] TA x       [] Mech Traction (45057)  [] ES(attended) (44102)      [] ES (un) (27271):     GOALS:   Patient stated goal: \"full function of left knee and be able to run, golf, and play platform tennis\"    Therapist goals for Patient:   Short Term Goals: To be achieved in: 2 weeks  1. Independent in HEP and progression per patient tolerance, in order to prevent re-injury. 2. Patient will have a decrease in pain to facilitate improvement in movement, function, and ADLs as indicated by Functional Deficits. Long Term Goals: To be achieved in: 8 weeks  1. Disability index score of 20% or less for the LEFS to assist with reaching prior level of function. 2. Patient will demonstrate increased AROM to within functional limites to allow for proper joint functioning as indicated by patients Functional Deficits. 3. Patient will demonstrate an increase in hip/knee strength 5/5 to allow for proper functional mobility as indicated by patients Functional Deficits. 4. Patient will return to all functional/recreational activities without increased symptoms or restriction. 5. Patient will return to pain free platform tennis. Progression Towards Functional goals:  [x] Patient is progressing as expected towards functional goals listed. [] Progression is slowed due to complexities listed. [] Progression has been slowed due to co-morbidities.   [] Plan just implemented, too soon to assess goals progression  [] Other:     ASSESSMENT:        Treatment/Activity Tolerance:  [x] Patient tolerated treatment well [x] Patient limited by fatique  [] Patient limited by pain  [] Patient limited by other medical complications  [x] Other: Pt reported having some MJL

## 2017-12-20 ENCOUNTER — OFFICE VISIT (OUTPATIENT)
Dept: ORTHOPEDIC SURGERY | Age: 42
End: 2017-12-20

## 2017-12-20 DIAGNOSIS — S83.232D COMPLEX TEAR OF MEDIAL MENISCUS OF LEFT KNEE AS CURRENT INJURY, SUBSEQUENT ENCOUNTER: Primary | ICD-10-CM

## 2017-12-20 PROCEDURE — 99213 OFFICE O/P EST LOW 20 MIN: CPT | Performed by: ORTHOPAEDIC SURGERY

## 2018-01-01 ENCOUNTER — HOSPITAL ENCOUNTER (OUTPATIENT)
Dept: PHYSICAL THERAPY | Age: 43
Discharge: HOME | End: 2018-01-31
Attending: ORTHOPAEDIC SURGERY | Admitting: ORTHOPAEDIC SURGERY

## 2018-01-08 ENCOUNTER — OFFICE VISIT (OUTPATIENT)
Dept: ORTHOPEDIC SURGERY | Age: 43
End: 2018-01-08

## 2018-01-08 VITALS
HEIGHT: 70 IN | WEIGHT: 274.91 LBS | DIASTOLIC BLOOD PRESSURE: 69 MMHG | SYSTOLIC BLOOD PRESSURE: 122 MMHG | BODY MASS INDEX: 39.36 KG/M2

## 2018-01-08 DIAGNOSIS — M25.462 EFFUSION OF LEFT KNEE: Primary | ICD-10-CM

## 2018-01-08 PROCEDURE — 20610 DRAIN/INJ JOINT/BURSA W/O US: CPT | Performed by: ORTHOPAEDIC SURGERY

## 2018-01-08 PROCEDURE — 99213 OFFICE O/P EST LOW 20 MIN: CPT | Performed by: ORTHOPAEDIC SURGERY

## 2018-01-08 NOTE — PROGRESS NOTES
1/8/18 10:28 AM         NDC: 5400-2529-52    Lot Number: [id number]    Comments:
or lesions. Strength and tone are normal.    Radiology:     I did review the MRI and agree findings of the radiologist.  MRI demonstrates evidence of previous meniscal debridement with no evidence of new meniscal tear. There is some inflammation along the anterior medial soft tissues capsulitis. He does demonstrate patella mikayla with slight lateral patellar subluxation         Assessment :  Left knee effusion capsulitis, patellofemoral maltracking    Impression:  Encounter Diagnosis   Name Primary?  Effusion of left knee Yes       Office Procedures:  Orders Placed This Encounter   Procedures    Ambulatory referral to Physical Therapy     Referral Priority:   Routine     Referral Type:   Eval and Treat     Referral Reason:   Specialty Services Required     Requested Specialty:   Physical Therapy     Number of Visits Requested:   1    MD METHYLPREDNISOLONE 40 MG INJ    MD ARTHROCENTESIS ASPIR&/INJ MAJOR JT/BURSA W/O US       Treatment Plan:  I discussed diagnosis and prognosis with him today. I would recommend trial cortisone injection into the left knee as he does demonstrate some capsular inflammation we'll see if we can calm down the capsular inflammation. He would like to get back into physical therapy as he does experience a lot of tightness still along his IT band and he also does demonstrate some patellar maltracking. He'll follow-up with me in 6 weeks    Under sterile conditions left knee was injected through superior lateral approach with 2 mL of 40 mg Depo-Medrol mixed with 3 mL quarter percent Marcaine. He did tolerate the injection well.   Postinjection precautions were given

## 2018-01-19 ENCOUNTER — HOSPITAL ENCOUNTER (OUTPATIENT)
Dept: PHYSICAL THERAPY | Age: 43
Discharge: HOME | End: 2018-01-19
Admitting: ORTHOPAEDIC SURGERY

## 2018-01-22 NOTE — PROGRESS NOTES
function. [] Continue rehabitation due to objective improvement and continued functional deficits with progression to work conditioning. [] Discharge to post-rehab program secondary to maximizing \"medical necessity\" of physical / occupational therapy. [] Discharge independent in a home program as:  [] All goals achieved  [] Maximized \"medical necessity\" of physical / occupational therapy  [] No subjective or objective improvements      Electronically signed by:  Delonte Olvera PT

## 2018-01-23 ENCOUNTER — HOSPITAL ENCOUNTER (OUTPATIENT)
Dept: PHYSICAL THERAPY | Age: 43
Discharge: HOME | End: 2018-01-23
Admitting: ORTHOPAEDIC SURGERY

## 2018-02-01 ENCOUNTER — HOSPITAL ENCOUNTER (OUTPATIENT)
Dept: PHYSICAL THERAPY | Age: 43
Discharge: HOME | End: 2018-02-28
Attending: ORTHOPAEDIC SURGERY | Admitting: ORTHOPAEDIC SURGERY

## 2018-02-21 ENCOUNTER — OFFICE VISIT (OUTPATIENT)
Dept: ORTHOPEDIC SURGERY | Age: 43
End: 2018-02-21

## 2018-02-21 VITALS — BODY MASS INDEX: 39.36 KG/M2 | WEIGHT: 274.91 LBS | HEIGHT: 70 IN

## 2018-02-21 DIAGNOSIS — M76.32 ILIOTIBIAL BAND SYNDROME OF LEFT SIDE: Primary | ICD-10-CM

## 2018-02-21 PROCEDURE — 99213 OFFICE O/P EST LOW 20 MIN: CPT | Performed by: ORTHOPAEDIC SURGERY

## 2018-02-21 RX ORDER — MELOXICAM 15 MG/1
15 TABLET ORAL DAILY
Qty: 30 TABLET | Refills: 3 | Status: SHIPPED | OUTPATIENT
Start: 2018-02-21

## 2018-02-21 NOTE — PROGRESS NOTES
Chief Complaint    Follow-up (left knee)      History of Present Illness:  Victor Hugo Brito is a 40 y.o. male. Follow-up for his left knee. Overall his left knee is doing much better at this point in time. He did get improvement from the cortisone injection. He does still have some tightness and discomfort over his IT band. He has not returned back to platform tennis yet. He has been doing a home therapy program.           Medical History:  Patient's medications, allergies, past medical, surgical, social and family histories were reviewed and updated as appropriate. Review of Systems:  Pertinent items are noted in HPI  Review of systems reviewed from Patient History Form dated on 2/21/18 and available in the patient's chart under the Media tab. Vital Signs:  Ht 5' 10\" (1.778 m)   Wt 274 lb 14.6 oz (124.7 kg)   BMI 39.45 kg/m²     General Exam:   Constitutional: Patient is adequately groomed with no evidence of malnutrition  DTRs: Deep tendon reflexes are intact  Mental Status: The patient is oriented to time, place and person. The patient's mood and affect are appropriate. Knee Examination:    Inspection:  No significant swelling erythema noted about the left knee today    Palpation:  There is some tenderness along his IT band distally. There is no medial joint line tenderness. No palpable effusion    Range of Motion:  0-130°    Strength:  He is able to do a straight leg raise    Special Tests:  Negative Lachman exam.  No instability to varus and valgus stress testing. Negative Apley Samson    Skin: There are no rashes, ulcerations or lesions. Gait: He is walking with a normal gait    Additional Comments:       Additional Examinations:         Right Lower Extremity: Examination of the right lower extremity does not show any tenderness, deformity or injury. Range of motion is unremarkable. There is no gross instability. There are no rashes, ulcerations or lesions.   Strength and tone are

## 2018-02-27 ENCOUNTER — HOSPITAL ENCOUNTER (OUTPATIENT)
Dept: PHYSICAL THERAPY | Age: 43
Discharge: HOME | End: 2018-02-28
Admitting: ORTHOPAEDIC SURGERY

## 2018-02-27 NOTE — PLAN OF CARE
limited or restricted  Mobility: Walking and Moving Around Goal Status (): At least 20 percent but less than 40 percent impaired, limited or restricted    Pain Scale: 8/10  Easing factors: movement, heat  Provocative factors: twisting, stairs, prolonged walking      Type: []Constant   [x]Intermittent  []Radiating [x]Localized []other:     Numbness/Tingling: None    Occupation/School: homemaker     Living Status/Prior Level of Function: Independent with ADLs and IADLs    OBJECTIVE:     ROM LEFT RIGHT   HIP Flex 80 100   HIP Abd     HIP Ext     HIP IR 5 15   HIP ER WNL WNL   Knee ext 0 0   Knee Flex 100 110   Ankle PF     Ankle DF     Ankle In     Ankle Ev     Strength  LEFT RIGHT   HIP Flexors 4    HIP Abductors 4    HIP Ext     Hip ER     Knee EXT (quad) 4    Knee Flex (HS) 4    Ankle DF     Ankle PF     Ankle Inv     Ankle EV          Circumference  Mid apex  7 cm prox             Reflexes/Sensation:    [x]Dermatomes/Myotomes intact    [x]Reflexes equal and normal bilaterally   []Other:    Joint mobility: L hip    []Normal    [x]Hypo   []Hyper    Palpation: Moderate tenderness to palpation L ITB, VLAT     Functional Mobility/Transfers: WNL    Posture: WNL    Bandages/Dressings/Incisions: NA    Gait: (include devices/WB status) WNL    Orthopedic Special Tests: LA/Jose neg L, Valgus/Varus neg L knee                       [x] Patient history, allergies, meds reviewed. Medical chart reviewed. See intake form. Review Of Systems (ROS):  [x]Performed Review of systems (Integumentary, CardioPulmonary, Neurological) by intake and observation. Intake form has been scanned into medical record. Patient has been instructed to contact their primary care physician regarding ROS issues if not already being addressed at this time.       Co-morbidities/Complexities (which will affect course of rehabilitation):   []None           Arthritic conditions   []Rheumatoid arthritis (M05.9)  []Osteoarthritis (M19.91)

## 2018-02-27 NOTE — FLOWSHEET NOTE
G. Med activaiton/sidelying       G. Max Activation/prone       Hip Ext full ROM G. Activation       Bosu Bal and Prop- G Med       Single leg stance/Balance/Prop       Bosu Retro G. Med act                         Therapeutic Exercise and NMR EXR  [] (81685) Provided verbal/tactile cueing for activities related to strengthening, flexibility, endurance, ROM for improvements in LE, proximal hip, and core control with self care, mobility, lifting, ambulation.  [] (97388) Provided verbal/tactile cueing for activities related to improving balance, coordination, kinesthetic sense, posture, motor skill, proprioception  to assist with LE, proximal hip, and core control in self care, mobility, lifting, ambulation and eccentric single leg control.      NMR and Therapeutic Activities:    [] (44134 or 39896) Provided verbal/tactile cueing for activities related to improving balance, coordination, kinesthetic sense, posture, motor skill, proprioception and motor activation to allow for proper function of core, proximal hip and LE with self care and ADLs  [] (42959) Gait Re-education- Provided training and instruction to the patient for proper LE, core and proximal hip recruitment and positioning and eccentric body weight control with ambulation re-education including up and down stairs     Home Exercise Program:    [x] (34040) Reviewed/Progressed HEP activities related to strengthening, flexibility, endurance, ROM of core, proximal hip and LE for functional self-care, mobility, lifting and ambulation/stair navigation   [] (21802)Reviewed/Progressed HEP activities related to improving balance, coordination, kinesthetic sense, posture, motor skill, proprioception of core, proximal hip and LE for self care, mobility, lifting, and ambulation/stair navigation      Manual Treatments:  PROM / STM / Oscillations-Mobs:  G-I, II, III, IV (PA's, Inf., Post.)  [x] (70368) Provided manual therapy to mobilize LE, proximal hip and/or LS

## 2018-03-01 ENCOUNTER — HOSPITAL ENCOUNTER (OUTPATIENT)
Dept: PHYSICAL THERAPY | Age: 43
Discharge: HOME | End: 2018-03-31
Attending: ORTHOPAEDIC SURGERY | Admitting: ORTHOPAEDIC SURGERY

## 2018-03-07 ENCOUNTER — HOSPITAL ENCOUNTER (OUTPATIENT)
Dept: PHYSICAL THERAPY | Age: 43
Discharge: HOME | End: 2018-03-08
Admitting: ORTHOPAEDIC SURGERY

## 2018-03-07 NOTE — FLOWSHEET NOTE
(44011) Provided verbal/tactile cueing for activities related to strengthening, flexibility, endurance, ROM for improvements in LE, proximal hip, and core control with self care, mobility, lifting, ambulation.  [] (04267) Provided verbal/tactile cueing for activities related to improving balance, coordination, kinesthetic sense, posture, motor skill, proprioception  to assist with LE, proximal hip, and core control in self care, mobility, lifting, ambulation and eccentric single leg control.      NMR and Therapeutic Activities:    [] (22107 or 08045) Provided verbal/tactile cueing for activities related to improving balance, coordination, kinesthetic sense, posture, motor skill, proprioception and motor activation to allow for proper function of core, proximal hip and LE with self care and ADLs  [] (14418) Gait Re-education- Provided training and instruction to the patient for proper LE, core and proximal hip recruitment and positioning and eccentric body weight control with ambulation re-education including up and down stairs     Home Exercise Program:    [x] (24345) Reviewed/Progressed HEP activities related to strengthening, flexibility, endurance, ROM of core, proximal hip and LE for functional self-care, mobility, lifting and ambulation/stair navigation   [] (45775)Reviewed/Progressed HEP activities related to improving balance, coordination, kinesthetic sense, posture, motor skill, proprioception of core, proximal hip and LE for self care, mobility, lifting, and ambulation/stair navigation      Manual Treatments:  PROM / STM / Oscillations-Mobs:  G-I, II, III, IV (PA's, Inf., Post.)  [x] (93667) Provided manual therapy to mobilize LE, proximal hip and/or LS spine soft tissue/joints for the purpose of modulating pain, promoting relaxation,  increasing ROM, reducing/eliminating soft tissue swelling/inflammation/restriction, improving soft tissue extensibility and allowing for proper ROM for normal function with self care, mobility, lifting and ambulation. Dry needling manual therapy: consisted on the placement of 8 needles in the following muscles: L ITB, VLAT. A 60 mm needle was inserted, piston, rotated, and coned to produce intramuscular mobilization. These techniques were used to restore functional range of motion, reduce muscle spasm and induce healing in the corresponding musculature. (92179)  Clean Technique was utilized today while applying Dry needling treatment. The treatment sites where cleaned with 70% solution of  isopropyl alcohol . The PT washed their hands and utilized treatment gloves along with hand  prior to inserting the needles. All needles where removed and discarded in the appropriate sharps container. Modalities:  Heat x10 min    Charges:  Timed Code Treatment Minutes: 45 min   Total Treatment Minutes: 60 min     [] EVAL  [x] BH(25638) x  1   [] IONTO  [] NMR (51048) x      [] VASO  [x] Manual (83606) x  2    [] Other:  [] TA x       [] Mech Traction (35215)  [] ES(attended) (44337)      [] ES (un) (32829):     GOALS:  Patient stated goal: \"normal activities without pain\"     Therapist goals for Patient:   Short Term Goals: To be achieved in: 2 weeks  1. Independent in HEP and progression per patient tolerance, in order to prevent re-injury. 2. Patient will have a decrease in pain to facilitate improvement in movement, function, and ADLs as indicated by Functional Deficits. Long Term Goals: To be achieved in: 10 weeks  1. Disability index score of 40% or less for the LEFS to assist with reaching prior level of function. 2. Patient will demonstrate increased AROM to WNL to allow for proper joint functioning as indicated by patients Functional Deficits. 3. Patient will demonstrate an increase in Strength to good proximal hip strength and control, to 5/5 in LE to allow for proper functional mobility as indicated by patients Functional Deficits.    4. Patient will return to all functional activities without increased symptoms or restriction. 5. Pt will negotiate stairs with no pain. Progression Towards Functional goals:  [] Patient is progressing as expected towards functional goals listed. [] Progression is slowed due to complexities listed. [] Progression has been slowed due to co-morbidities. [x] Plan just implemented, too soon to assess goals progression  [] Other:     ASSESSMENT:      Treatment/Activity Tolerance:  [x] Patient tolerated treatment well [] Patient limited by fatique  [] Patient limited by pain  [] Patient limited by other medical complications  [] Other:     Prognosis: [x] Good [] Fair  [] Poor    Patient Requires Follow-up: [x] Yes  [] No    PLAN: Progress hip mobility and increase therex as tolerated to safely negotiate stairs   [] Continue per plan of care [] Alter current plan (see comments)  [x] Plan of care initiated [] Hold pending MD visit [] Discharge    Electronically signed by:  Will Kruse PT

## 2018-03-13 ENCOUNTER — HOSPITAL ENCOUNTER (OUTPATIENT)
Dept: PHYSICAL THERAPY | Age: 43
Discharge: HOME | End: 2018-03-14
Admitting: ORTHOPAEDIC SURGERY

## 2018-03-20 ENCOUNTER — HOSPITAL ENCOUNTER (OUTPATIENT)
Dept: PHYSICAL THERAPY | Age: 43
Discharge: HOME | End: 2018-03-21
Admitting: ORTHOPAEDIC SURGERY

## 2018-03-20 NOTE — FLOWSHEET NOTE
[facility]                         Therapeutic Exercise and NMR EXR  [] (09787) Provided verbal/tactile cueing for activities related to strengthening, flexibility, endurance, ROM for improvements in LE, proximal hip, and core control with self care, mobility, lifting, ambulation.  [] (56237) Provided verbal/tactile cueing for activities related to improving balance, coordination, kinesthetic sense, posture, motor skill, proprioception  to assist with LE, proximal hip, and core control in self care, mobility, lifting, ambulation and eccentric single leg control.      NMR and Therapeutic Activities:    [] (66158 or 56427) Provided verbal/tactile cueing for activities related to improving balance, coordination, kinesthetic sense, posture, motor skill, proprioception and motor activation to allow for proper function of core, proximal hip and LE with self care and ADLs  [] (54732) Gait Re-education- Provided training and instruction to the patient for proper LE, core and proximal hip recruitment and positioning and eccentric body weight control with ambulation re-education including up and down stairs     Home Exercise Program:    [x] (52283) Reviewed/Progressed HEP activities related to strengthening, flexibility, endurance, ROM of core, proximal hip and LE for functional self-care, mobility, lifting and ambulation/stair navigation   [] (17164)Reviewed/Progressed HEP activities related to improving balance, coordination, kinesthetic sense, posture, motor skill, proprioception of core, proximal hip and LE for self care, mobility, lifting, and ambulation/stair navigation      Manual Treatments:  PROM / STM / Oscillations-Mobs:  G-I, II, III, IV (PA's, Inf., Post.)  [x] (17796) Provided manual therapy to mobilize LE, proximal hip and/or LS spine soft tissue/joints for the purpose of modulating pain, promoting relaxation,  increasing ROM, reducing/eliminating soft tissue swelling/inflammation/restriction, improving soft tissue extensibility and allowing for proper ROM for normal function with self care, mobility, lifting and ambulation. Dry needling manual therapy: consisted on the placement of 8 needles in the following muscles: L ITB, VLAT. A 60 mm needle was inserted, piston, rotated, and coned to produce intramuscular mobilization. These techniques were used to restore functional range of motion, reduce muscle spasm and induce healing in the corresponding musculature. (01578)  Clean Technique was utilized today while applying Dry needling treatment. The treatment sites where cleaned with 70% solution of  isopropyl alcohol . The PT washed their hands and utilized treatment gloves along with hand  prior to inserting the needles. All needles where removed and discarded in the appropriate sharps container. Modalities:  Heat x10 min    Charges:  Timed Code Treatment Minutes: 45 min   Total Treatment Minutes: 60 min     [] EVAL  [x] RT(52392) x  1   [] IONTO  [] NMR (30645) x      [] VASO  [x] Manual (99320) x  2    [] Other:  [] TA x       [] Mech Traction (20287)  [] ES(attended) (10825)      [] ES (un) (00681):     GOALS:  Patient stated goal: \"normal activities without pain\"     Therapist goals for Patient:   Short Term Goals: To be achieved in: 2 weeks  1. Independent in HEP and progression per patient tolerance, in order to prevent re-injury. 2. Patient will have a decrease in pain to facilitate improvement in movement, function, and ADLs as indicated by Functional Deficits. Long Term Goals: To be achieved in: 10 weeks  1. Disability index score of 40% or less for the LEFS to assist with reaching prior level of function. 2. Patient will demonstrate increased AROM to WNL to allow for proper joint functioning as indicated by patients Functional Deficits.    3. Patient will demonstrate an increase in Strength to good proximal hip strength and control, to 5/5 in LE to allow for proper functional mobility as indicated by patients Functional Deficits. 4. Patient will return to all functional activities without increased symptoms or restriction. 5. Pt will negotiate stairs with no pain. Progression Towards Functional goals:  [] Patient is progressing as expected towards functional goals listed. [] Progression is slowed due to complexities listed. [] Progression has been slowed due to co-morbidities. [x] Plan just implemented, too soon to assess goals progression  [] Other:     ASSESSMENT:  Pt demonstrates less hip joint restriction and more muscular limitations with hip ROM. Treatment/Activity Tolerance:  [x] Patient tolerated treatment well [] Patient limited by fatique  [] Patient limited by pain  [] Patient limited by other medical complications  [] Other:     Prognosis: [x] Good [] Fair  [] Poor    Patient Requires Follow-up: [x] Yes  [] No    PLAN: Progress hip mobility and increase therex as tolerated to safely negotiate stairs   [] Continue per plan of care [] Alter current plan (see comments)  [x] Plan of care initiated [] Hold pending MD visit [] Discharge    Electronically signed by:  Will Kruse PT

## 2018-03-26 ENCOUNTER — HOSPITAL ENCOUNTER (OUTPATIENT)
Dept: PHYSICAL THERAPY | Age: 43
Discharge: HOME | End: 2018-03-27
Admitting: ORTHOPAEDIC SURGERY

## 2018-03-26 NOTE — FLOWSHEET NOTE
stance/Balance/Prop       [facility]                         Therapeutic Exercise and NMR EXR  [] (46636) Provided verbal/tactile cueing for activities related to strengthening, flexibility, endurance, ROM for improvements in LE, proximal hip, and core control with self care, mobility, lifting, ambulation.  [] (34857) Provided verbal/tactile cueing for activities related to improving balance, coordination, kinesthetic sense, posture, motor skill, proprioception  to assist with LE, proximal hip, and core control in self care, mobility, lifting, ambulation and eccentric single leg control.      NMR and Therapeutic Activities:    [] (40902 or 30529) Provided verbal/tactile cueing for activities related to improving balance, coordination, kinesthetic sense, posture, motor skill, proprioception and motor activation to allow for proper function of core, proximal hip and LE with self care and ADLs  [] (49041) Gait Re-education- Provided training and instruction to the patient for proper LE, core and proximal hip recruitment and positioning and eccentric body weight control with ambulation re-education including up and down stairs     Home Exercise Program:    [x] (12114) Reviewed/Progressed HEP activities related to strengthening, flexibility, endurance, ROM of core, proximal hip and LE for functional self-care, mobility, lifting and ambulation/stair navigation   [] (36785)Reviewed/Progressed HEP activities related to improving balance, coordination, kinesthetic sense, posture, motor skill, proprioception of core, proximal hip and LE for self care, mobility, lifting, and ambulation/stair navigation      Manual Treatments:  PROM / STM / Oscillations-Mobs:  G-I, II, III, IV (PA's, Inf., Post.)  [x] (80062) Provided manual therapy to mobilize LE, proximal hip and/or LS spine soft tissue/joints for the purpose of modulating pain, promoting relaxation,  increasing ROM, reducing/eliminating soft tissue hip strength and control, to 5/5 in LE to allow for proper functional mobility as indicated by patients Functional Deficits. 4. Patient will return to all functional activities without increased symptoms or restriction. 5. Pt will negotiate stairs with no pain. Progression Towards Functional goals:  [] Patient is progressing as expected towards functional goals listed. [] Progression is slowed due to complexities listed. [] Progression has been slowed due to co-morbidities. [x] Plan just implemented, too soon to assess goals progression  [] Other:     ASSESSMENT:  Pt demonstrates less hip joint restriction and more muscular limitations with hip ROM. Treatment/Activity Tolerance:  [x] Patient tolerated treatment well [] Patient limited by fatique  [] Patient limited by pain  [] Patient limited by other medical complications  [] Other:     Prognosis: [x] Good [] Fair  [] Poor    Patient Requires Follow-up: [x] Yes  [] No    PLAN: Progress hip mobility and increase therex as tolerated to safely negotiate stairs   [] Continue per plan of care [] Alter current plan (see comments)  [x] Plan of care initiated [] Hold pending MD visit [] Discharge    Electronically signed by:  Will Kruse PT

## 2018-04-01 ENCOUNTER — HOSPITAL ENCOUNTER (OUTPATIENT)
Dept: PHYSICAL THERAPY | Age: 43
Discharge: HOME | End: 2018-04-30
Attending: ORTHOPAEDIC SURGERY | Admitting: ORTHOPAEDIC SURGERY

## 2018-04-04 ENCOUNTER — HOSPITAL ENCOUNTER (OUTPATIENT)
Dept: PHYSICAL THERAPY | Age: 43
Discharge: HOME | End: 2018-04-05
Admitting: ORTHOPAEDIC SURGERY

## 2018-04-11 ENCOUNTER — TELEPHONE (OUTPATIENT)
Dept: ORTHOPEDIC SURGERY | Age: 43
End: 2018-04-11

## 2018-04-11 ENCOUNTER — HOSPITAL ENCOUNTER (OUTPATIENT)
Dept: PHYSICAL THERAPY | Age: 43
Discharge: HOME | End: 2018-04-12
Admitting: ORTHOPAEDIC SURGERY

## 2018-05-01 ENCOUNTER — HOSPITAL ENCOUNTER (OUTPATIENT)
Dept: PHYSICAL THERAPY | Age: 43
Discharge: HOME | End: 2018-05-31
Attending: ORTHOPAEDIC SURGERY | Admitting: ORTHOPAEDIC SURGERY